# Patient Record
Sex: MALE | Race: WHITE | NOT HISPANIC OR LATINO | URBAN - METROPOLITAN AREA
[De-identification: names, ages, dates, MRNs, and addresses within clinical notes are randomized per-mention and may not be internally consistent; named-entity substitution may affect disease eponyms.]

---

## 2018-05-16 NOTE — PROGRESS NOTES
Return in about 1 year (around 5/23/2018).   Progress Notes     Merged Document Preview                    HISTORY OF PRESENT ILLNESS:  This patient is a 22-year-old who originally was found to have Tetralogy of Fallot.  He underwent a full repair at 8 months of age.  This entailed closing the ventricular septal defect and opening up the sub-pulmonary region by placing a patch over the right ventricular outflow tract.  Although he generally did well, when I saw him at 3 years of age, he had a very loud murmur.  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 He required additional open heart surgery.  I believe he had a good hemodynamic result.  He comes to [facility] today, 5/17/18, at [facility] in [facility].  He is accompanied by his mother and father.  Doron reports no problems with chest pain, palpitations, abnormally  rapid or slow heart rates, or inability to keep up with his usual activities. He also denies having headaches, lightheadedness, syncope or seizures.He is currently studying electrical engineering in college.  He has another year or so before he completes his bachelor's degree. The patient is on no medications.  He has no other known medical problems.     REVIEW OF SYSTEMS:  There are no reported visual disturbances or hearing deficits. No swallowing disorder. He has no problems breathing or with SOB.  No abdominal pain. Bowel movements are normal.  No pelvic pain. Urination is normal. He has full range of motion of his extremities. No DM or thyroid disease.  No known arterial disease.  No HBP. No lipid disorder.  No known intrinsic problems with the lungs, liver or kidneys. No bleeding disorder.     PHYSICAL EXAMINATION:  GENERAL:  A healthy looking 22-year-old in no distress.  VITAL SIGNS:  His weight is 78 kg or 172 pounds.  Height is 1.727 meters or 5 feet 8 inches.  Heart rate is 57 beats  per minute and regular.  Saturations are 99 % in room air.  Blood pressure in the right arm is 110/62.  Color and perfusion are good.  HEENT: Normal eye movements.  No problems with vision. There are no bruits over the head. The mucous membranes were moist and pink.  There is no adenopathy.  No jugular venous distention.  NECK:  Supple.  The thyroid is not enlarged.  CHEST: There is a well-healed midline scar.  Clear breath sounds bilaterally. Good air entry.  No wheezes, rhonchi or rubs.  CARDIOVASCULAR:  Normal precordial activity.  S1 is normal and S2 is split.  There is a grade I/VI systolic ejection murmur heard up the left sternal border with some transmission across the base.  Diastole is clear.  No gallops, clicks or rubs.  ABDOMEN:  Normal bowel sounds. Liver edge is at the right costal margin and nontender. Normal bowel sounds.  No masses palpable.  EXTREMITIES:  Full range of motion.  Pulses are 2+ throughout.  Capillary refill is good.  There is no peripheral edema.  No cyanosis or bruising.          ........................................................       ECG:  Normal sinus rhythm. Ventricular rate 59 bpm.   RBBB. Minor ST elevation.        An echocardiogram was performed.  No pericardial effusion.  It showed four normally related cardiac chambers.  No evidence for a residual atrial septal defect or ventricular septal defect. The ventricular septal defect patch is seen to be in good position.  No right or left ventricular outflow tract obstruction. The tricuspid and mitral valves appear to be normal.  The aortic valve is trileaflet.  The aortic arch is unobstructed.  There are no clots or vegetations. DOPPLER VELOCITY ANALYSIS: There is moderate tricuspid insufficiency with a peak pressure drop of 32 mmHg suggesting a mild elevation in right-sided pressure.  Trivial pulmonary insufficiency. Peak pressure drop across the pulmonary valve is 9 mmHg.  There is no mitral or ortic insufficiency. Peak  pressure drop across the aortic valve is 7 mmHg.  Pulmonary venous return is to the left atrium.     MEASUREMENTS BY M-MODE:  Left ventricular internal diastolic dimension is 4.7 cm.  The right ventricle is 2.4 cm.  The aortic root is 3.7 cm.  The left atrium is 3.8 cm. Interventricular septal thickness during diastole is 0.9 cm and the posterior wall is 1.0 cm.  Ejection fraction is lower limit of normal at 50%.       ........................................................................................................       ASSESSMENT:  In summary, we have a 22-year-old who originally was born with tetralogy of Fallot. He had his initial operation at 8 months of age.  He developed residual right ventricular outflow tract obstruction and required a redo operation at 3 years of age.  I believe he now has a good hemodynamic result.  Clinically, he is doing very well.  He is active physically.  He attends college.     PLAN:  Given our findings, our suggestions are as follows: 1.  Antibiotic prophylaxis is in order for any invasive procedures.  2.  I would like the patient to return to [facility]iology Clinic in one year for followup.  At that time, we will obtain an electrocardiogram and an   echocardiogram.  3. Consider a stress test.     If there are any questions concerning the cardiac status of this patient, please  feel free to contact us.     Thank you so much for allowing us to partake in the care of this patient.  Sincerely, Tolu Woodson PhD, MD.

## 2018-05-17 ENCOUNTER — CLINICAL SUPPORT (OUTPATIENT)
Dept: CARDIOLOGY | Facility: CLINIC | Age: 23
End: 2018-05-17
Payer: COMMERCIAL

## 2018-05-17 ENCOUNTER — OFFICE VISIT (OUTPATIENT)
Dept: CARDIOLOGY | Facility: CLINIC | Age: 23
End: 2018-05-17
Payer: COMMERCIAL

## 2018-05-17 VITALS
HEART RATE: 57 BPM | OXYGEN SATURATION: 99 % | HEIGHT: 68 IN | DIASTOLIC BLOOD PRESSURE: 79 MMHG | WEIGHT: 172 LBS | BODY MASS INDEX: 26.07 KG/M2 | SYSTOLIC BLOOD PRESSURE: 120 MMHG

## 2018-05-17 DIAGNOSIS — Q24.3 PULMONARY STENOSIS, SUBVALVAR: ICD-10-CM

## 2018-05-17 DIAGNOSIS — Q24.3 PULMONARY STENOSIS, SUBVALVAR: Primary | ICD-10-CM

## 2018-05-17 DIAGNOSIS — Z98.890 HISTORY OF OPEN HEART SURGERY: ICD-10-CM

## 2018-05-17 PROCEDURE — 3008F BODY MASS INDEX DOCD: CPT | Mod: CPTII,S$GLB,, | Performed by: PEDIATRICS

## 2018-05-17 PROCEDURE — 93303 ECHO TRANSTHORACIC: CPT | Mod: S$GLB,,, | Performed by: PEDIATRICS

## 2018-05-17 PROCEDURE — 93325 DOPPLER ECHO COLOR FLOW MAPG: CPT | Mod: S$GLB,,, | Performed by: PEDIATRICS

## 2018-05-17 PROCEDURE — 93320 DOPPLER ECHO COMPLETE: CPT | Mod: S$GLB,,, | Performed by: PEDIATRICS

## 2018-05-17 PROCEDURE — 99214 OFFICE O/P EST MOD 30 MIN: CPT | Mod: 25,S$GLB,, | Performed by: PEDIATRICS

## 2018-05-17 PROCEDURE — 93000 ELECTROCARDIOGRAM COMPLETE: CPT | Mod: 59,S$GLB,, | Performed by: PEDIATRICS
